# Patient Record
Sex: MALE | Race: WHITE | Employment: OTHER | URBAN - METROPOLITAN AREA
[De-identification: names, ages, dates, MRNs, and addresses within clinical notes are randomized per-mention and may not be internally consistent; named-entity substitution may affect disease eponyms.]

---

## 2017-01-01 ENCOUNTER — TELEPHONE (OUTPATIENT)
Dept: ONCOLOGY | Age: 72
End: 2017-01-01

## 2017-01-01 ENCOUNTER — OFFICE VISIT (OUTPATIENT)
Dept: ONCOLOGY | Age: 72
End: 2017-01-01
Payer: COMMERCIAL

## 2017-01-01 ENCOUNTER — HOSPITAL ENCOUNTER (OUTPATIENT)
Dept: PHYSICAL THERAPY | Age: 72
Setting detail: THERAPIES SERIES
Discharge: HOME | End: 2017-07-06
Payer: COMMERCIAL

## 2017-01-01 ENCOUNTER — HOSPITAL ENCOUNTER (OUTPATIENT)
Age: 72
Discharge: HOME | End: 2017-09-29
Payer: COMMERCIAL

## 2017-01-01 ENCOUNTER — HOSPITAL ENCOUNTER (OUTPATIENT)
Dept: PHYSICAL THERAPY | Age: 72
Setting detail: THERAPIES SERIES
Discharge: HOME | End: 2017-06-13
Payer: COMMERCIAL

## 2017-01-01 ENCOUNTER — HOSPITAL ENCOUNTER (OUTPATIENT)
Age: 72
End: 2017-01-01
Payer: COMMERCIAL

## 2017-01-01 ENCOUNTER — HOSPITAL ENCOUNTER (OUTPATIENT)
Dept: PHYSICAL THERAPY | Age: 72
Setting detail: THERAPIES SERIES
Discharge: HOME | End: 2017-07-12
Payer: COMMERCIAL

## 2017-01-01 ENCOUNTER — HOSPITAL ENCOUNTER (OUTPATIENT)
Dept: PHYSICAL THERAPY | Age: 72
Setting detail: THERAPIES SERIES
Discharge: HOME | End: 2017-06-26
Payer: COMMERCIAL

## 2017-01-01 ENCOUNTER — HOSPITAL ENCOUNTER (OUTPATIENT)
Dept: CT IMAGING | Age: 72
Discharge: HOME | End: 2017-09-25
Payer: COMMERCIAL

## 2017-01-01 ENCOUNTER — HOSPITAL ENCOUNTER (OUTPATIENT)
Dept: MRI IMAGING | Age: 72
Discharge: HOME | End: 2017-12-28
Payer: COMMERCIAL

## 2017-01-01 ENCOUNTER — HOSPITAL ENCOUNTER (OUTPATIENT)
Dept: PHYSICAL THERAPY | Age: 72
Setting detail: THERAPIES SERIES
Discharge: HOME | End: 2017-07-11
Payer: COMMERCIAL

## 2017-01-01 ENCOUNTER — HOSPITAL ENCOUNTER (OUTPATIENT)
Dept: PHYSICAL THERAPY | Age: 72
Setting detail: THERAPIES SERIES
Discharge: HOME | End: 2017-07-05
Payer: COMMERCIAL

## 2017-01-01 ENCOUNTER — HOSPITAL ENCOUNTER (OUTPATIENT)
Age: 72
Discharge: HOME | End: 2017-04-15
Payer: COMMERCIAL

## 2017-01-01 VITALS
DIASTOLIC BLOOD PRESSURE: 56 MMHG | SYSTOLIC BLOOD PRESSURE: 118 MMHG | HEART RATE: 82 BPM | RESPIRATION RATE: 16 BRPM | TEMPERATURE: 97.5 F

## 2017-01-01 VITALS
HEART RATE: 82 BPM | RESPIRATION RATE: 18 BRPM | DIASTOLIC BLOOD PRESSURE: 89 MMHG | TEMPERATURE: 97.7 F | SYSTOLIC BLOOD PRESSURE: 139 MMHG

## 2017-01-01 DIAGNOSIS — C83.33 DIFFUSE LARGE B-CELL LYMPHOMA OF INTRA-ABDOMINAL LYMPH NODES (HCC): ICD-10-CM

## 2017-01-01 DIAGNOSIS — C83.33 DIFFUSE LARGE B-CELL LYMPHOMA OF INTRA-ABDOMINAL LYMPH NODES (HCC): Primary | ICD-10-CM

## 2017-01-01 DIAGNOSIS — G62.1 ALCOHOL-INDUCED POLYNEUROPATHY (HCC): ICD-10-CM

## 2017-01-01 DIAGNOSIS — R41.89 IMPAIRED COGNITION: ICD-10-CM

## 2017-01-01 DIAGNOSIS — R91.8 LUNG MASS: ICD-10-CM

## 2017-01-01 DIAGNOSIS — K70.30 ALCOHOLIC CIRRHOSIS OF LIVER WITHOUT ASCITES (HCC): ICD-10-CM

## 2017-01-01 LAB
ABSOLUTE EOS #: 0.1 K/UL (ref 0–0.4)
ABSOLUTE EOS #: 0.1 K/UL (ref 0–0.4)
ABSOLUTE LYMPH #: 1 K/UL (ref 1–4.8)
ABSOLUTE LYMPH #: 1.3 K/UL (ref 1–4.8)
ABSOLUTE MONO #: 0.4 K/UL (ref 0.1–1.3)
ABSOLUTE MONO #: 0.5 K/UL (ref 0.1–1.3)
ALBUMIN SERPL-MCNC: 4.3 G/DL (ref 3.5–5.2)
ALBUMIN SERPL-MCNC: 4.5 G/DL (ref 3.5–5.2)
ALBUMIN/GLOBULIN RATIO: ABNORMAL (ref 1–2.5)
ALBUMIN/GLOBULIN RATIO: ABNORMAL (ref 1–2.5)
ALP BLD-CCNC: 69 U/L (ref 40–129)
ALP BLD-CCNC: 89 U/L (ref 40–129)
ALT SERPL-CCNC: 30 U/L (ref 5–41)
ALT SERPL-CCNC: 33 U/L (ref 5–41)
ANION GAP SERPL CALCULATED.3IONS-SCNC: 17 MMOL/L (ref 9–17)
ANION GAP SERPL CALCULATED.3IONS-SCNC: 17 MMOL/L (ref 9–17)
AST SERPL-CCNC: 42 U/L
AST SERPL-CCNC: 48 U/L
BASOPHILS # BLD: 1 %
BASOPHILS # BLD: 1 % (ref 0–2)
BASOPHILS ABSOLUTE: 0.1 K/UL (ref 0–0.2)
BASOPHILS ABSOLUTE: 0.1 K/UL (ref 0–0.2)
BILIRUB SERPL-MCNC: 1.77 MG/DL (ref 0.3–1.2)
BILIRUB SERPL-MCNC: 2.47 MG/DL (ref 0.3–1.2)
BUN BLDV-MCNC: 12 MG/DL (ref 8–23)
BUN BLDV-MCNC: 21 MG/DL (ref 8–23)
BUN/CREAT BLD: ABNORMAL (ref 9–20)
BUN/CREAT BLD: ABNORMAL (ref 9–20)
CALCIUM SERPL-MCNC: 8.5 MG/DL (ref 8.6–10.4)
CALCIUM SERPL-MCNC: 9.1 MG/DL (ref 8.6–10.4)
CHLORIDE BLD-SCNC: 103 MMOL/L (ref 98–107)
CHLORIDE BLD-SCNC: 94 MMOL/L (ref 98–107)
CO2: 23 MMOL/L (ref 20–31)
CO2: 25 MMOL/L (ref 20–31)
CREAT SERPL-MCNC: 0.99 MG/DL (ref 0.7–1.2)
CREAT SERPL-MCNC: 1.38 MG/DL (ref 0.7–1.2)
DIFFERENTIAL TYPE: ABNORMAL
DIFFERENTIAL TYPE: ABNORMAL
EOSINOPHILS RELATIVE PERCENT: 1 %
EOSINOPHILS RELATIVE PERCENT: 1 % (ref 0–4)
GFR AFRICAN AMERICAN: >60 ML/MIN
GFR AFRICAN AMERICAN: >60 ML/MIN
GFR NON-AFRICAN AMERICAN: 51 ML/MIN
GFR NON-AFRICAN AMERICAN: >60 ML/MIN
GFR SERPL CREATININE-BSD FRML MDRD: ABNORMAL ML/MIN/{1.73_M2}
GLUCOSE BLD-MCNC: 116 MG/DL (ref 70–99)
GLUCOSE BLD-MCNC: 133 MG/DL (ref 70–99)
HCT VFR BLD CALC: 41.6 % (ref 41–53)
HCT VFR BLD CALC: 42.6 % (ref 41–53)
HEMOGLOBIN: 14.3 G/DL (ref 13.5–17.5)
HEMOGLOBIN: 14.9 G/DL (ref 13.5–17.5)
LACTATE DEHYDROGENASE: 190 U/L (ref 135–225)
LACTATE DEHYDROGENASE: 209 U/L (ref 135–225)
LYMPHOCYTES # BLD: 19 %
LYMPHOCYTES # BLD: 21 % (ref 24–44)
MCH RBC QN AUTO: 34.3 PG (ref 26–34)
MCH RBC QN AUTO: 35 PG (ref 26–34)
MCHC RBC AUTO-ENTMCNC: 34.4 G/DL (ref 31–37)
MCHC RBC AUTO-ENTMCNC: 34.9 G/DL (ref 31–37)
MCV RBC AUTO: 100.2 FL (ref 80–100)
MCV RBC AUTO: 99.6 FL (ref 80–100)
MONOCYTES # BLD: 7 %
MONOCYTES # BLD: 7 % (ref 1–7)
PDW BLD-RTO: 13.3 % (ref 11.5–14.9)
PDW BLD-RTO: 13.6 % (ref 11.5–14.9)
PLATELET # BLD: 122 K/UL (ref 150–450)
PLATELET # BLD: 141 K/UL (ref 150–450)
PLATELET ESTIMATE: ABNORMAL
PLATELET ESTIMATE: ABNORMAL
PMV BLD AUTO: 10.4 FL (ref 6–12)
PMV BLD AUTO: 9.7 FL (ref 6–12)
POTASSIUM SERPL-SCNC: 3.6 MMOL/L (ref 3.7–5.3)
POTASSIUM SERPL-SCNC: 3.7 MMOL/L (ref 3.7–5.3)
RBC # BLD: 4.17 M/UL (ref 4.5–5.9)
RBC # BLD: 4.25 M/UL (ref 4.5–5.9)
RBC # BLD: ABNORMAL 10*6/UL
RBC # BLD: ABNORMAL 10*6/UL
SEG NEUTROPHILS: 70 % (ref 36–66)
SEG NEUTROPHILS: 72 %
SEGMENTED NEUTROPHILS ABSOLUTE COUNT: 3.8 K/UL (ref 1.3–9.1)
SEGMENTED NEUTROPHILS ABSOLUTE COUNT: 4.2 K/UL (ref 1.3–9.1)
SODIUM BLD-SCNC: 136 MMOL/L (ref 135–144)
SODIUM BLD-SCNC: 143 MMOL/L (ref 135–144)
TOTAL PROTEIN: 6.8 G/DL (ref 6.4–8.3)
TOTAL PROTEIN: 6.9 G/DL (ref 6.4–8.3)
WBC # BLD: 5.4 K/UL (ref 3.5–11)
WBC # BLD: 6.1 K/UL (ref 3.5–11)
WBC # BLD: ABNORMAL 10*3/UL
WBC # BLD: ABNORMAL 10*3/UL

## 2017-01-01 PROCEDURE — G8427 DOCREV CUR MEDS BY ELIG CLIN: HCPCS | Performed by: INTERNAL MEDICINE

## 2017-01-01 PROCEDURE — 97162 PT EVAL MOD COMPLEX 30 MIN: CPT

## 2017-01-01 PROCEDURE — G8979 MOBILITY GOAL STATUS: HCPCS

## 2017-01-01 PROCEDURE — 85025 COMPLETE CBC W/AUTO DIFF WBC: CPT

## 2017-01-01 PROCEDURE — 80053 COMPREHEN METABOLIC PANEL: CPT

## 2017-01-01 PROCEDURE — 97113 AQUATIC THERAPY/EXERCISES: CPT

## 2017-01-01 PROCEDURE — 97110 THERAPEUTIC EXERCISES: CPT

## 2017-01-01 PROCEDURE — G8978 MOBILITY CURRENT STATUS: HCPCS

## 2017-01-01 PROCEDURE — 74176 CT ABD & PELVIS W/O CONTRAST: CPT

## 2017-01-01 PROCEDURE — 83615 LACTATE (LD) (LDH) ENZYME: CPT

## 2017-01-01 PROCEDURE — 99214 OFFICE O/P EST MOD 30 MIN: CPT | Performed by: INTERNAL MEDICINE

## 2017-01-01 PROCEDURE — 99211 OFF/OP EST MAY X REQ PHY/QHP: CPT

## 2017-01-01 PROCEDURE — 70551 MRI BRAIN STEM W/O DYE: CPT

## 2017-01-01 PROCEDURE — G8421 BMI NOT CALCULATED: HCPCS | Performed by: INTERNAL MEDICINE

## 2017-01-01 PROCEDURE — 6360000004 HC RX CONTRAST MEDICATION: Performed by: INTERNAL MEDICINE

## 2017-01-01 PROCEDURE — 4004F PT TOBACCO SCREEN RCVD TLK: CPT | Performed by: INTERNAL MEDICINE

## 2017-01-01 PROCEDURE — 36415 COLL VENOUS BLD VENIPUNCTURE: CPT

## 2017-01-01 PROCEDURE — 4040F PNEUMOC VAC/ADMIN/RCVD: CPT | Performed by: INTERNAL MEDICINE

## 2017-01-01 PROCEDURE — 1123F ACP DISCUSS/DSCN MKR DOCD: CPT | Performed by: INTERNAL MEDICINE

## 2017-01-01 PROCEDURE — 3017F COLORECTAL CA SCREEN DOC REV: CPT | Performed by: INTERNAL MEDICINE

## 2017-01-01 PROCEDURE — G8484 FLU IMMUNIZE NO ADMIN: HCPCS | Performed by: INTERNAL MEDICINE

## 2017-01-01 PROCEDURE — 71250 CT THORAX DX C-: CPT

## 2017-01-01 PROCEDURE — 97164 PT RE-EVAL EST PLAN CARE: CPT

## 2017-01-01 RX ADMIN — IOHEXOL 50 ML: 240 INJECTION, SOLUTION INTRATHECAL; INTRAVASCULAR; INTRAVENOUS; ORAL at 10:56

## 2017-10-03 PROBLEM — K70.30 ALCOHOLIC CIRRHOSIS OF LIVER WITHOUT ASCITES (HCC): Status: ACTIVE | Noted: 2017-01-01

## 2017-10-03 PROBLEM — R91.8 LUNG MASS: Status: ACTIVE | Noted: 2017-01-01

## 2017-10-03 NOTE — MR AVS SNAPSHOT
If you have questions, please contact the physician practice where you receive care. Remember, MyChart is NOT to be used for urgent needs. For medical emergencies, dial 911. For questions regarding your MyChart account call [phone]. If you have a clinical question, please call your doctor's office.

## 2017-10-03 NOTE — LETTER
HENT: Negative for hearing loss and sore throat. Eyes: Negative for blurred vision, double vision and photophobia. Respiratory: Positive for shortness of breath (mild and stable ). Negative for cough, hemoptysis, sputum production, wheezing and stridor. Cardiovascular: Negative for chest pain, orthopnea, claudication and PND. Gastrointestinal: Negative for diarrhea, heartburn, nausea and vomiting. Genitourinary: Negative for dysuria, hematuria and urgency. Pain and difficulty with urination, as discussed above. Musculoskeletal: Positive for back pain and joint pain. Negative for falls, myalgias and neck pain. Skin: Negative for itching and rash. Neurological: Positive for tingling (mild and improving ). Negative for dizziness, tremors, sensory change, speech change and headaches. Endo/Heme/Allergies: Negative for polydipsia. Does not bruise/bleed easily. Psychiatric/Behavioral: Positive for substance abuse (still drinking, but cut down a bit). Negative for depression, hallucinations and suicidal ideas. .    Physical Examination :   Blood pressure 139/89, pulse 82, temperature 97.7 °F (36.5 °C), temperature source Oral, resp. rate 18. Performance Status:Estimated performance status is ECOG 1   General appearance - well appearing, no in pain or distress, He looks better than last visit.    Mental status - alert and cooperative   Eyes - pupils equal and reactive, extraocular eye movements intact   Ears - bilateral TM's and external ear canals normal   Mouth - mucous membranes moist, pharynx normal without lesions   Neck - supple, no significant adenopathy ,trach is central   Lymphatics - no palpable lymphadenopathy, no hepatosplenomegaly   Chest - clear to auscultation, no wheezes, rales or rhonchi, symmetric air entry   Heart - normal rate, regular rhythm, normal S1, S2, distant heart sounds, G3 ROLA heard best at the apex Abdomen - soft, nontender, nondistended, no masses or organomegaly . Neurological - alert, oriented, normal speech, grade 1 sensory neuropathy. Musculoskeletal - no joint tenderness, deformity or swelling   Extremities -  No edema  Skin - normal coloration and turgor, no rashes, solar keratosis, With a new lesion on his right gastrocnemius area. REVIEW OF LABORATORY DATA:  Lab Results   Component Value Date    WBC 5.4 09/29/2017    HGB 14.9 09/29/2017    HCT 42.6 09/29/2017    .2 (H) 09/29/2017     (L) 09/29/2017       Chemistry        Component Value Date/Time     09/29/2017 1113    K 3.7 09/29/2017 1113     09/29/2017 1113    CO2 23 09/29/2017 1113    BUN 12 09/29/2017 1113    CREATININE 0.99 09/29/2017 1113        Component Value Date/Time    CALCIUM 9.1 09/29/2017 1113    ALKPHOS 69 09/29/2017 1113    AST 42 (H) 09/29/2017 1113    ALT 30 09/29/2017 1113    BILITOT 2.47 (H) 09/29/2017 1113        REVIEW OF RADIOLOGICAL RESULTS:  09/25/2017 CT CHEST ABDOMEN AND PELVIS IMPRESSION:  *No definitive CT evidence of tumor progression within the chest, abdomen or   pelvis. *Stable soft tissue density seen within the periaortic region measuring 11 mm   greatest axial dimension. *New 4 mm solid noncalcified pulmonary nodule superior segment right lower   lobe. Repeat CT chest in 3 months is recommended. *Cirrhotic liver. *Cholelithiasis. *Enlarged prostate gland.  Please correlate with PSA levels. *Stable compression deformity T11. Assessment:   1. 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 He still has residual small adenopathy that continues to shrink with time. No evidence of progression. 2. Significant improvement In performance status and symptoms as he responded to chemotherapy  3.  History of congestive heart failure and coronary artery disease, Repeat echocardiogram showed significant cardiomyopathy. Leading to us avoid giving anthracycline   4. 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 Things improved significantly. I think his neuropathy is a combination of his previous head trauma, alcoholism as well as chemotherapy. He is doing much better and able to walk much further. 5. Alcohol abuse, unable to quit. 6. Depressio. Back on SSRI   7. HTN, watching BP, he states it is monitored closely by PCP       Plan:   1. We discussed his recent CT which showed no evidence of progression but does shows new nodule, stable previous nodule, gall stones, and liver cirrhosis. 2. We reviewed recent lab work, his liver function is elevated. 3. I counseled him on alcohol cessation as that is the cause of his elevated bilirubin. 4. His alkaline-phosphotase is normal and I do not believe his gallbladder to be inflamed at this time but recommend surgery if he develops symptoms including pain and vomiting. 5. I recommend he follow up with urology. 6. I explained I will monitor the nodules with CT. 7. Return in 6 months with CT. If you have questions, please do not hesitate to call me. I look forward to following Jessee Love along with you.     Sincerely,    Henrietta Fletcher MD  Hematology/ Oncology  Cell [phone]

## 2017-10-03 NOTE — PROGRESS NOTES
continue the chemotherapy, we finished cycles 4,5 then he got sick with recurrent UTI and dehydration, requiring hospitalization (X2). We decided to hold cycle 6. CT scan showed good response so we continued surveillance      INTERIM HISTORY   He presents today for a follow up. He reports he drinks less alcohol than before. He has pain and difficult with urination due to enlarged prostrate. He has follow up with urology. 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 Surgical History   has past surgical history that includes Hand surgery; Hand surgery; and Testicle surgery. Home Medications   has a current medication list which includes the following prescription(s): folic acid, furosemide, spironolactone, albuterol, magnesium hydroxide, lorazepam, insulin lispro, docusate sodium, hydrocodone-acetaminophen, polyethylene glycol, promethazine, senna-docusate, hexylresorcinol (antiseptic), morphine (pf), allopurinol, gabapentin, carvedilol, duloxetine hcl, and prednisone,     Allergies:   Review of patient's allergies indicates no known allergies. SOCIAL HISTORY: Past smoker. Quit greater than 20 years ago. Alcohol: Patient consumed approximately 3 to 5 beers or other alcohol drinks daily up until his previous admission. No illicit drug use. Review of Systems :    Constitutional: Negative for chills, fever and weight loss. Fatigue and appetite loss. HENT: Negative for hearing loss and sore throat. Eyes: Negative for blurred vision, double vision and photophobia. Respiratory: Positive for shortness of breath (mild and stable ). Negative for cough, hemoptysis, sputum production, wheezing and stridor. Cardiovascular: Negative for chest pain, orthopnea, claudication and PND.    Gastrointestinal: monitored closely by PCP       Plan:   1. We discussed his recent CT which showed no evidence of progression but does shows new nodule, stable previous nodule, gall stones, and liver cirrhosis. 2. We reviewed recent lab work, his liver function is elevated. 3. I counseled him on alcohol cessation as that is the cause of his elevated bilirubin. 4. His alkaline-phosphotase is normal and I do not believe his gallbladder to be inflamed at this time but recommend surgery if he develops symptoms including pain and vomiting. 5. I recommend he follow up with urology. 6. I explained I will monitor the nodules with CT. 7. Return in 6 months with CT.

## 2018-01-18 ENCOUNTER — TELEPHONE (OUTPATIENT)
Dept: ONCOLOGY | Age: 73
End: 2018-01-18

## 2018-01-18 NOTE — TELEPHONE ENCOUNTER
Voicemail received from Lizett Nelson @ [facility] [place] questioning where Dr Guerda Anaya wants patient's death certificate dropped off to sign. Lizett Nelson stated that per , Dr Guerda Anaya agreed to sign death certificate. Lizett Nelson stated patient passed away on 12/30/17. I will discuss above with Dr Guerda Anaya and call Lizett Nelson back at [phone].  Angelita Sommers